# Patient Record
(demographics unavailable — no encounter records)

---

## 2025-06-20 NOTE — PLAN
[FreeTextEntry1] : Right/left/bilateral groin/abdominal/incisional/umbilical hernia, symptomatic  - F/u standard pre-op labs and EKG  -Obtain PCP clearance  -Obtain CT A/P w/o contrast, needs disc  - Plan for robotic assisted, possible open, ___  inguinal /abdominal / ventral / incisional / umbilical hernia repair with mesh   - Go to ED if pain is resistant to pain medication or if symptoms worsen (N/V/fever, chills)

## 2025-06-20 NOTE — PHYSICAL EXAM
[JVD] : no jugular venous distention  [Normal Breath Sounds] : Normal breath sounds [Normal Heart Sounds] : normal heart sounds [No Rash or Lesion] : No rash or lesion [Alert] : alert [Calm] : calm [de-identified] :  in no acute distress. Well- developed, well-nourished.  [de-identified] : normocephalic [de-identified] : soft, non-distended, non-tender, no rebound or guarding. ?? +right/ left/ mid abdomen/ inguinal hernia, reducible.  [de-identified] : deferred

## 2025-06-20 NOTE — ADDENDUM
[FreeTextEntry1] :  I, HE LUZ MARIA, am scribing for and in the presence of Dr. Juarez for the following sections: HISTORY OF PRESENT ILLNESS, PAST MEDICAL HISTORY, PAST SURGICAL HISTORY, FAMILY/SOCIAL HISTORY, REVIEW OF SYSTEMS, VITAL SIGNS, PHYSICAL EXAM, DISPOSITION.Reviewed current treatment plan, including medications / surgical intervention / lifestyle modifications where indicated. Reviewed imaging, laboratory results, or other diagnostics as applicable. Addressed patient concerns, including potential complications, risk factors, or alternative treatment options as applicable. Provided detailed education on pre- or post-operative instructions,and expected outcome as applicable. Instructed pt to call office for questions or concerns. Instructed patient to schedule follow-up appointment as recommended. Referrals and testing ordered where indicated. Plan of care reviewed with patient, and questions answered. The total time spent with patient included more than 50% of the time dedicated to counseling and coordination of care.

## 2025-06-20 NOTE — ASSESSMENT
[FreeTextEntry1] : This is a 45 y/o Male presents today for initial evaluation of ventral hernia.   The pt with a PMHx of ....... and PSHx of ........ Patient reports worsening of discomfort over the last.......weeks/month in abdomen exacerbated by coughing, straining, sneezing, lifting. Patient reports expansion of bulging/swelling that is reducible and associated with intermittent non-radiating pain. Relief with self-reduction/low activity/reclining. Denies nausea, vomiting, fever, chills, pain out of proportion, chest pain, HA, dizziness. Denies constipation, difficulty with bowel movements or urination. Denies shortness of breath, MEDRANO, or difficulty breathing, patient can walk / climb 2 flights without stopping. Denies hx of blood clots or bleeding problems.   Imaging: CT A/P  ( / / )w/ w/o contrast:   Exam significant for:  + ??incisional/ parastomal/ spigelian hernia, reducible.     The nature and risks of hernia were discussed as were signs and symptoms of incarceration, obstruction and strangulation as possible risks of observation. A thorough preoperative education has been performed about the role and the different surgical options have been discussed with patient. Risks, benefits and alternatives have been discussed and patient verbalizes understanding. Laparoscopic/Robotic/Open repair of inguinal hernia/abdominal hernia was discussed with the patient at length. The risks, benefits, and alternatives of the types of repair as well as to the use of mesh were discussed and all questions answered. Risks of hernia repair include, but are not limited to infection, bleeding, recurrence and injury to adjacent structures and nerves. Advised patient about possible risk of future complications if hernia worsens or goes untreated. Patient would like to proceed with RA ventral repair with mesh.